# Patient Record
Sex: FEMALE | Race: WHITE | NOT HISPANIC OR LATINO | URBAN - METROPOLITAN AREA
[De-identification: names, ages, dates, MRNs, and addresses within clinical notes are randomized per-mention and may not be internally consistent; named-entity substitution may affect disease eponyms.]

---

## 2018-12-20 ENCOUNTER — OFFICE VISIT (OUTPATIENT)
Dept: GYNECOLOGY | Facility: CLINIC | Age: 73
End: 2018-12-20
Payer: COMMERCIAL

## 2018-12-20 VITALS
BODY MASS INDEX: 25.52 KG/M2 | WEIGHT: 130 LBS | SYSTOLIC BLOOD PRESSURE: 150 MMHG | DIASTOLIC BLOOD PRESSURE: 90 MMHG | HEIGHT: 60 IN

## 2018-12-20 DIAGNOSIS — Z87.42 HISTORY OF ENDOMETRIOSIS: ICD-10-CM

## 2018-12-20 DIAGNOSIS — Z01.419 ENCOUNTER FOR GYNECOLOGICAL EXAMINATION WITHOUT ABNORMAL FINDING: Primary | ICD-10-CM

## 2018-12-20 DIAGNOSIS — G89.29 CHRONIC PELVIC PAIN IN FEMALE: ICD-10-CM

## 2018-12-20 DIAGNOSIS — N39.3 STRESS INCONTINENCE OF URINE: ICD-10-CM

## 2018-12-20 DIAGNOSIS — R10.2 CHRONIC PELVIC PAIN IN FEMALE: ICD-10-CM

## 2018-12-20 DIAGNOSIS — N95.0 POSTMENOPAUSAL BLEEDING: ICD-10-CM

## 2018-12-20 PROBLEM — R32 URINARY INCONTINENCE: Status: ACTIVE | Noted: 2018-12-20

## 2018-12-20 PROCEDURE — 87624 HPV HI-RISK TYP POOLED RSLT: CPT | Performed by: OBSTETRICS & GYNECOLOGY

## 2018-12-20 PROCEDURE — G0101 CA SCREEN;PELVIC/BREAST EXAM: HCPCS | Performed by: OBSTETRICS & GYNECOLOGY

## 2018-12-20 PROCEDURE — 99215 OFFICE O/P EST HI 40 MIN: CPT | Mod: 25 | Performed by: OBSTETRICS & GYNECOLOGY

## 2018-12-20 PROCEDURE — 88175 CYTOPATH C/V AUTO FLUID REDO: CPT | Performed by: OBSTETRICS & GYNECOLOGY

## 2018-12-20 RX ORDER — DILTIAZEM HYDROCHLORIDE 60 MG/1
TABLET, FILM COATED ORAL
Refills: 2 | COMMUNITY
Start: 2018-09-24

## 2018-12-20 RX ORDER — GUAIFENESIN 600 MG/1
600 TABLET, EXTENDED RELEASE ORAL
COMMUNITY

## 2018-12-20 RX ORDER — ACETAMINOPHEN AND CODEINE PHOSPHATE 300; 30 MG/1; MG/1
1 TABLET ORAL EVERY 4 HOURS PRN
COMMUNITY

## 2018-12-20 RX ORDER — ACETAMINOPHEN 500 MG
5000 TABLET ORAL DAILY
COMMUNITY

## 2018-12-20 RX ORDER — LANOLIN ALCOHOL/MO/W.PET/CERES
1000 CREAM (GRAM) TOPICAL DAILY
COMMUNITY

## 2018-12-20 RX ORDER — ALBUTEROL SULFATE 90 UG/1
2 INHALANT RESPIRATORY (INHALATION) EVERY 6 HOURS PRN
COMMUNITY

## 2018-12-20 NOTE — PROGRESS NOTES
"Chief Complaint:  Annual and problem    Last exam 4 yrs ago. PCP had been doing paps, none abnormal..But states abnormal in the past [id number]  Mammo 2018.  Dexa 2018 shows osteoporosis. On vit D only, calcium thru food. No meds recommended. Walks, was doing balance, no strength.    Colonoscopy due.    Problem visit is chronic pelvic pain, endometriosis with severe pain requiring mult surgeries last in .  Had RSO and appendectomy.Still with daily pain requiring opiods. Has pain with defecation that is lower abd pressure as well as rectal pain.  Occ dysuria resolves spontaneously can last a week or two.  Notes she has occ abd pain with twisting movements. Occ assoc with back pain.    Had US about 5 yrs ago showed \" growths in my uterus\".. Had bx at that visit unclear if it was an endo bx. No bleeding at that time.    Recent spotting \" can't tell if it was vaginal or urinary\"    Not sexually active    HPI:  2018      Fouzia Isaac is a 73 y.o. female who presents for annual exam. The patient has no complaints today. The patient is not currently sexually active. GYN screening history: last pap: was normal and per pt had abnormals about  and . The patient has never been taking hormone replacement therapy. Patient denies post-menopausal vaginal bleeding.  unclear. The patient participates in regular exercise: yes.  The patient reports that there is not domestic violence in her life.    History of abnormal Pap smear:yes, details unclear  Family history of uterine or ovarian cancer: no  History of abnormal mammogram: no  Family history of breast cancer: no    OB History: Menstrual History:  OB History      Para Term  AB Living    1       1      SAB TAB Ectopic Multiple Live Births    1                 No LMP recorded. Patient is postmenopausal.         Medical History:   Past Medical History:   Diagnosis Date   • Asthma    • Chronic pelvic pain in female     tyl # 3 hs and prn x 8 - 9 yrs " "  • Endometriosis    • Endometritis    • Osteoporosis    • Osteoporosis 2018    no meds   • Pain associated with defecation     for years, not constipated, but does have \"pellets\". has lower pelvic presure and rectal pain.   • PTSD (post-traumatic stress disorder)        Surgical History:   Past Surgical History:   Procedure Laterality Date   • APPENDECTOMY     • CHOLECYSTECTOMY  2000   • LAPAROTOMY OOPHERECTOMY     • AR REMOVAL OF OVARY/TUBE(S) Right     partial left ovary in tact       Social History:   Social History     Social History   • Marital status:      Spouse name: N/A   • Number of children: N/A   • Years of education: N/A     Social History Main Topics   • Smoking status: Never Smoker   • Smokeless tobacco: Never Used   • Alcohol use No   • Drug use: No   • Sexual activity: Not Currently     Partners: Male     Other Topics Concern   • None     Social History Narrative   • None       Family History:   Family History   Problem Relation Age of Onset   • Colon cancer Paternal Grandfather    • Hypertension Father    • Hypertension Mother    • Stroke Mother    • Heart disease Mother    • Osteoporosis Sister    • Skin cancer Sister    • Osteoporosis Father's Sister    • Diabetes Sister    • Lupus Sister        Current Medications:   Current Outpatient Prescriptions   Medication Sig Dispense Refill   • acetaminophen-codeine (TYLENOL #3) 300-30 mg per tablet Take 1 tablet by mouth Every 4 hours as needed.     • albuterol HFA (VENTOLIN HFA) 90 mcg/actuation inhaler Inhale 2 puffs every 6 hours as needed.     • cholecalciferol, vitamin D3, 5,000 unit tablet Take 5,000 Units by mouth daily.     • cyanocobalamin (vitamin B-12) 1,000 mcg tablet Take 1,000 mcg by mouth daily.     • guaiFENesin (MUCINEX) 600 mg 12 hr tablet Take 600 mg by mouth.     • multivitamin capsule Take 1 capsule by mouth daily.     • SYMBICORT 80-4.5 mcg/actuation inhaler TAKE 2 PUFFS BY MOUTH TWICE A DAY  2     No current " facility-administered medications for this visit.        Allergies: Amoxicillin-pot clavulanate; Prednisone; and Sulfa (sulfonamide antibiotics)    Review of Systems  Constitutional: negative for weight loss and no appetite change  Gastrointestinal: positive for abdominal pain, constipation and see hpi, abd pain is chronic  Genitourinary:positive for urinary incontinence  Reproductive:she complains of abd and pelvic pain  Integument/breast: negative for breast lump, breast tenderness and nipple discharge  Musculoskeletal:positive for back pain and stiff joints  Neurological: negative  Behavioral/Psych: positive for depression, PTSD  Endocrine: negative    Physical Exam  BP (!) 150/90 (BP Location: Right upper arm, Patient Position: Sitting)   Ht 1.524 m (5')   Wt 59 kg (130 lb)   BMI 25.39 kg/m²      General Appearance: Alert, cooperative, no acute distress  Head: Normocephalic, without obvious abnormality, atraumatic    Neck: no adenopathy  Nodes: no enlargement of axillary or supraclavicular nodes  Thyroid: no enlargement/tenderness/nodules  Lungs: Clear to auscultation bilaterally, respirations unlabored  Heart: Regular rate and rhythm, S1 and S2 normal, no murmur, rub or gallop  Breast: no masses, nontender and no discharge  Abdomen: Soft, nontender, nondistended,   no masses, no organomegaly  Back: kyphosisNo   Pelvic:     Vulva normal, Bartholin's, Urethra, [place]'s normal  Vagina atrophic, + physiologic secretions, no iliococcygeal tenderness, + obturator tenderness rt> left though some tenderenss to light touch left, + bladder tenderness  Cervixcervical motion tenderness and atrophic  Uterusnormal size, anteverted, some tenderenss but unclear if it was realted to bladder tenderness, unable toreach uterosacrals  Adnexa not palpable, some mild adnexal discomfort on exam  Rectal: mass No   Extremities: wnl  Musculoskeletal:wnl  Skin: Skin color, texture, turgor normal, no rashes or  lesions    Neurologic:oriented and  memory intact  Psych:normal affect and behavior appropriate    Assessment & Plan    Problem List Items Addressed This Visit     History of endometriosis    Overview     Multiple surgeries including RSO and lysis of adhesions pelvic and abd requiring laparoscopy/lapartomy 1994.  All records and op notes reviewed    Should not have active endometriosis now.         Urinary incontinence    Overview     Mostly bunny, better, now with nocturia. Has had for years.         Chronic pelvic pain in female    Overview     From endometriosis and mult surgeries, being managed x yrs with tyl # 3 hs and prn during the day, tried another med last yr with an antidepressant, did not work for her.    Evidence of pelvic floor dysfunction. May also have fascia as source of pain and adhesive disease. Or source may be bladder. Surgery not indicated. Will first try PT and then pain specialist who specializes in pelvic pain.  To try and wean her off the opioids. Will also have to be attentive to her anxiety and past hx of PTSD             Postmenopausal bleeding    Overview     story not clear. Check pap and TVUS         Relevant Orders    US PELVIS TRANSABDOMINAL & TRANSVAGINAL    Cytology, Thinprep Pap    THINPREP PAP ([facility])      Other Visit Diagnoses     Encounter for gynecological examination without abnormal finding    -  Primary    Relevant Orders    Cytology, Thinprep Pap    THINPREP PAP ([facility])        Will need endobx if her TVUS shows thickened stripe.  Referred to [facility] for PT    Pt very stressed with father's illness. TVUS and PT can wait until Jan.  Return if symptoms worsen or fail to improve.  Frieda Ruby MD

## 2018-12-20 NOTE — PATIENT INSTRUCTIONS
[facility], call them and ask if they take medicare for chronic pelvic and abdominal pain management.

## 2019-01-18 LAB
CASE RPRT: NORMAL
CLINICAL INFO: NORMAL
CLINICAL INFO: NORMAL
HPV HR 12 DNA CVX QL NAA+PROBE: NEGATIVE
HPV16 DNA SPEC QL NAA+PROBE: NEGATIVE
HPV18 DNA SPEC QL NAA+PROBE: NEGATIVE
LMP START DATE: NORMAL
SPECIMEN PROCESSING COMMENT: NORMAL
THIN PREP CVX: NORMAL

## 2019-01-31 ENCOUNTER — HOSPITAL ENCOUNTER (OUTPATIENT)
Dept: RADIOLOGY | Facility: HOSPITAL | Age: 74
Discharge: HOME | End: 2019-01-31
Attending: OBSTETRICS & GYNECOLOGY
Payer: COMMERCIAL

## 2019-01-31 DIAGNOSIS — N95.0 POSTMENOPAUSAL BLEEDING: ICD-10-CM

## 2019-01-31 PROCEDURE — 76830 TRANSVAGINAL US NON-OB: CPT

## 2024-09-05 ENCOUNTER — APPOINTMENT (RX ONLY)
Dept: URBAN - METROPOLITAN AREA CLINIC 28 | Facility: CLINIC | Age: 79
Setting detail: DERMATOLOGY
End: 2024-09-05

## 2024-09-05 DIAGNOSIS — L20.89 OTHER ATOPIC DERMATITIS: ICD-10-CM | Status: INADEQUATELY CONTROLLED

## 2024-09-05 PROCEDURE — 99204 OFFICE O/P NEW MOD 45 MIN: CPT

## 2024-09-05 PROCEDURE — ? PRESCRIPTION MEDICATION MANAGEMENT

## 2024-09-05 PROCEDURE — ? PRESCRIPTION

## 2024-09-05 PROCEDURE — ? MDM - TREATMENT GOALS

## 2024-09-05 PROCEDURE — ? COUNSELING

## 2024-09-05 PROCEDURE — ? DUPIXENT INITIATION

## 2024-09-05 PROCEDURE — ? PHOTO-DOCUMENTATION

## 2024-09-05 RX ORDER — CLOBETASOL PROPIONATE 0.5 MG/G
OINTMENT TOPICAL TWICE DAILY
Qty: 120 | Refills: 3 | Status: ERX | COMMUNITY
Start: 2024-09-05

## 2024-09-05 RX ORDER — TRIAMCINOLONE ACETONIDE 1 MG/G
OINTMENT TOPICAL BID
Qty: 454 | Refills: 5 | Status: ERX | COMMUNITY
Start: 2024-09-05

## 2024-09-05 RX ORDER — TACROLIMUS 1 MG/G
OINTMENT TOPICAL BID
Qty: 120 | Refills: 5 | Status: ERX | COMMUNITY
Start: 2024-09-05

## 2024-09-05 RX ADMIN — TRIAMCINOLONE ACETONIDE: 1 OINTMENT TOPICAL at 00:00

## 2024-09-05 RX ADMIN — CLOBETASOL PROPIONATE: 0.5 OINTMENT TOPICAL at 00:00

## 2024-09-05 RX ADMIN — TACROLIMUS: 1 OINTMENT TOPICAL at 00:00

## 2024-09-05 ASSESSMENT — LOCATION DETAILED DESCRIPTION DERM
LOCATION DETAILED: LEFT PROXIMAL POSTERIOR UPPER ARM
LOCATION DETAILED: LEFT DISTAL PRETIBIAL REGION
LOCATION DETAILED: RIGHT DISTAL POSTERIOR THIGH
LOCATION DETAILED: RIGHT DORSAL FOOT
LOCATION DETAILED: RIGHT DISTAL POSTERIOR UPPER ARM
LOCATION DETAILED: LEFT SUPERIOR MEDIAL MIDBACK
LOCATION DETAILED: LEFT ANKLE
LOCATION DETAILED: LEFT DISTAL POSTERIOR THIGH
LOCATION DETAILED: RIGHT DISTAL PRETIBIAL REGION
LOCATION DETAILED: EPIGASTRIC SKIN

## 2024-09-05 ASSESSMENT — BSA ECZEMA: % BODY COVERED IN ECZEMA: 30

## 2024-09-05 ASSESSMENT — LOCATION SIMPLE DESCRIPTION DERM
LOCATION SIMPLE: LEFT LOWER BACK
LOCATION SIMPLE: LEFT POSTERIOR UPPER ARM
LOCATION SIMPLE: LEFT PRETIBIAL REGION
LOCATION SIMPLE: LEFT POSTERIOR THIGH
LOCATION SIMPLE: RIGHT POSTERIOR THIGH
LOCATION SIMPLE: RIGHT FOOT
LOCATION SIMPLE: RIGHT POSTERIOR UPPER ARM
LOCATION SIMPLE: LEFT ANKLE
LOCATION SIMPLE: RIGHT PRETIBIAL REGION
LOCATION SIMPLE: ABDOMEN

## 2024-09-05 ASSESSMENT — SEVERITY ASSESSMENT 2020: SEVERITY 2020: SEVERE

## 2024-09-05 ASSESSMENT — LOCATION ZONE DERM
LOCATION ZONE: FEET
LOCATION ZONE: ARM
LOCATION ZONE: LEG
LOCATION ZONE: TRUNK

## 2024-09-05 ASSESSMENT — ITCH NUMERIC RATING SCALE: HOW SEVERE IS YOUR ITCHING?: 10

## 2024-09-05 NOTE — HPI: RASH
What Type Of Note Output Would You Prefer (Optional)?: Bullet Format
How Severe Is Your Rash?: severe
Is This A New Presentation, Or A Follow-Up?: Rash
Additional History: She has history of asthma. Several family members with eczema. Has been prescribed oral steroids in past as well as triamcinolone ointment, clobetasol ointment, and tacrolimus ointment but none working. Prior dermatologist talked with her about Dupixent and it got approved but she never started.

## 2024-09-05 NOTE — PROCEDURE: DUPIXENT INITIATION
Detail Level: Zone
Is Soriatane Contraindicated?: No
Dupixent Dosing Override: 200 mg every 2 weeks
Diagnosis (Required): Atopic Dermatitis/Eczematous Dermatitis
Pregnancy And Lactation Warning Text: There have not been adverse fetal risks in women taking Dupixent while pregnant. It is unknown if this medication is excreted in breast milk.
Dupixent Dosing: 600 mg SC day 0 then 300 mg SC every other week
Dupixent Monitoring Guidelines: There is no laboratory monitoring requirement with Dupixent.

## 2024-09-05 NOTE — PROCEDURE: PRESCRIPTION MEDICATION MANAGEMENT
Detail Level: Zone
Continue Regimen: clobetasol 0.05 % topical ointment: Apply twice a day to worst areas of eczema on body for up to 2 weeks at a time, then take a break for 1 week. Repeat cycle as needed. Do not use on face, armpits, or genital area.\\ntacrolimus 0.1 % topical ointment: Non-steroid: Apply twice a day as needed to eczema on body when taking a break from topical steroids.\\ntriamcinolone acetonide 0.1 % topical ointment: Apply a thin layer to eczema on body twice a day for up to 2 weeks, then take a break for 1 week. Repeat cycle as needed. Do not use on face, armpits, or genital area.
Plan: Has failed therapy with triamcinolone ointment, clobetasol ointment, tacrolimus ointment, and oral prednisone. Given extensive BSA and severe disease she is good candidate for Dupixent.
Render In Strict Bullet Format?: No

## 2024-09-12 ENCOUNTER — RX ONLY (OUTPATIENT)
Age: 79
Setting detail: RX ONLY
End: 2024-09-12

## 2024-09-12 RX ORDER — DUPILUMAB 300 MG/2ML
INJECTION, SOLUTION SUBCUTANEOUS
Qty: 4 | Refills: 11 | Status: ERX | COMMUNITY
Start: 2024-09-12